# Patient Record
Sex: MALE | Race: OTHER | URBAN - METROPOLITAN AREA
[De-identification: names, ages, dates, MRNs, and addresses within clinical notes are randomized per-mention and may not be internally consistent; named-entity substitution may affect disease eponyms.]

---

## 2019-05-14 NOTE — PATIENT DISCUSSION
PHOTOGRAPHS: I have reviewed the external ocular photographs of this patient which show the following: mild dermatochalasis of the right upper eyelid and mild retraction of the right upper eyelid.

## 2019-05-14 NOTE — PATIENT DISCUSSION
Patient does not present w/ any significant drooping of either upper eyelid today. Explained that her current symptoms are due to the fact that her left eye is more open than her right eye and this is likely exacerbating any dry eye issues. Recommend lubrication w/ AT drops near term. Follow up in 3-4 months for continued observation.

## 2019-09-10 NOTE — PATIENT DISCUSSION
PHOTOGRAPHS: I have reviewed the external ocular photographs of this patient which show the following: mild ptosis of the right upper eyelid.

## 2019-09-10 NOTE — PATIENT DISCUSSION
Explained that although her eyes appear asymmetrical at this time insurance would not cover a surgical procedure due to the fact that her vision is not obstructed. Discussed the r/b of a cosemtic levator advancement in order to lift the right upper eyelid. Patient expressed understanding.

## 2022-08-26 ENCOUNTER — NEW PATIENT (OUTPATIENT)
Dept: URBAN - METROPOLITAN AREA CLINIC 52 | Facility: CLINIC | Age: 62
End: 2022-08-26

## 2022-08-26 DIAGNOSIS — H02.834: ICD-10-CM

## 2022-08-26 DIAGNOSIS — H02.831: ICD-10-CM

## 2022-08-26 DIAGNOSIS — H57.813: ICD-10-CM

## 2022-08-26 PROCEDURE — 92004 COMPRE OPH EXAM NEW PT 1/>: CPT

## 2022-08-26 ASSESSMENT — TONOMETRY
OD_IOP_MMHG: 17
OS_IOP_MMHG: 16

## 2022-08-26 ASSESSMENT — VISUAL ACUITY
OD_CC: 20/20
OU_CC: J1+
OS_CC: 20/20